# Patient Record
Sex: MALE | Race: OTHER | NOT HISPANIC OR LATINO | URBAN - METROPOLITAN AREA
[De-identification: names, ages, dates, MRNs, and addresses within clinical notes are randomized per-mention and may not be internally consistent; named-entity substitution may affect disease eponyms.]

---

## 2022-03-15 ENCOUNTER — NEW PATIENT (OUTPATIENT)
Dept: URBAN - METROPOLITAN AREA CLINIC 53 | Facility: CLINIC | Age: 67
End: 2022-03-15

## 2022-03-15 DIAGNOSIS — H31.011: ICD-10-CM

## 2022-03-15 DIAGNOSIS — H40.1414: ICD-10-CM

## 2022-03-15 DIAGNOSIS — H25.13: ICD-10-CM

## 2022-03-15 DIAGNOSIS — Z01.01: ICD-10-CM

## 2022-03-15 PROCEDURE — 92015 DETERMINE REFRACTIVE STATE: CPT

## 2022-03-15 PROCEDURE — 92004 COMPRE OPH EXAM NEW PT 1/>: CPT

## 2022-03-15 ASSESSMENT — VISUAL ACUITY
OS_SC: J1+
OD_SC: J6
OD_CC: 20/20
OD_SC: 20/25
OS_SC: 20/40
OS_CC: 20/20

## 2022-03-15 ASSESSMENT — TONOMETRY
OS_IOP_MMHG: 20
OD_IOP_MMHG: 23

## 2022-03-15 NOTE — PATIENT DISCUSSION
Slightly elevated IOP OD. Schedule next available HVF/RNFL OCT. Patient will be called with results.

## 2022-04-21 ENCOUNTER — DIAGNOSTICS ONLY (OUTPATIENT)
Dept: URBAN - METROPOLITAN AREA CLINIC 48 | Facility: CLINIC | Age: 67
End: 2022-04-21

## 2022-04-21 DIAGNOSIS — H40.1414: ICD-10-CM

## 2022-04-21 PROCEDURE — 92083 EXTENDED VISUAL FIELD XM: CPT

## 2022-04-21 PROCEDURE — 92133 CPTRZD OPH DX IMG PST SGM ON: CPT

## 2022-05-31 ENCOUNTER — FOLLOW UP (OUTPATIENT)
Dept: URBAN - METROPOLITAN AREA CLINIC 53 | Facility: CLINIC | Age: 67
End: 2022-05-31

## 2022-05-31 DIAGNOSIS — H40.1414: ICD-10-CM

## 2022-05-31 DIAGNOSIS — H25.13: ICD-10-CM

## 2022-05-31 PROCEDURE — 76514 ECHO EXAM OF EYE THICKNESS: CPT

## 2022-05-31 PROCEDURE — 92012 INTRM OPH EXAM EST PATIENT: CPT

## 2022-05-31 RX ORDER — BIMATOPROST 0.1 MG/ML
1 SOLUTION/ DROPS OPHTHALMIC EVERY EVENING
Start: 2022-05-31

## 2022-05-31 ASSESSMENT — TONOMETRY
OD_IOP_MMHG: 31
OS_IOP_MMHG: 23
OD_IOP_MMHG: 24
OS_IOP_MMHG: 19

## 2022-05-31 ASSESSMENT — PACHYMETRY
OD_CT_UM: 436
OS_CT_UM: 480

## 2022-05-31 ASSESSMENT — VISUAL ACUITY
OS_CC: 20/25+1
OU_CC: 20/20-1
OD_CC: 20/25-1

## 2022-05-31 NOTE — PATIENT DISCUSSION
HVF/RNFL OCT testing from 4/21/22 was reviewed with patient. Increased IOP OU today. Will start drop therapy. Start Lumigan OU Qhs. (sample given) Advised of redness with use. Follow up in 1 week  for IOP check.

## 2022-06-09 ENCOUNTER — FOLLOW UP (OUTPATIENT)
Dept: URBAN - METROPOLITAN AREA CLINIC 49 | Facility: CLINIC | Age: 67
End: 2022-06-09

## 2022-06-09 DIAGNOSIS — H40.052: ICD-10-CM

## 2022-06-09 DIAGNOSIS — H40.1414: ICD-10-CM

## 2022-06-09 PROCEDURE — 92012 INTRM OPH EXAM EST PATIENT: CPT

## 2022-06-09 ASSESSMENT — TONOMETRY
OS_IOP_MMHG: 16
OS_IOP_MMHG: 12
OD_IOP_MMHG: 21
OD_IOP_MMHG: 14

## 2022-06-09 ASSESSMENT — VISUAL ACUITY
OD_CC: 20/25-
OS_CC: 20/25

## 2022-06-09 NOTE — PATIENT DISCUSSION
Decreased IOP OU since starting drop therapy. Continue current Lumigan drop therapy. Follow up in 6 months for IOP check.

## 2023-01-18 ENCOUNTER — FOLLOW UP (OUTPATIENT)
Dept: URBAN - METROPOLITAN AREA CLINIC 49 | Facility: CLINIC | Age: 68
End: 2023-01-18

## 2023-01-18 DIAGNOSIS — H40.1421: ICD-10-CM

## 2023-01-18 DIAGNOSIS — H40.1414: ICD-10-CM

## 2023-01-18 PROCEDURE — 92012 INTRM OPH EXAM EST PATIENT: CPT

## 2023-01-18 PROCEDURE — 92020 GONIOSCOPY: CPT

## 2023-01-18 PROCEDURE — 65855 TRABECULOPLASTY LASER SURG: CPT

## 2023-01-18 ASSESSMENT — TONOMETRY
OS_IOP_MMHG: 12
OD_IOP_MMHG: 16
OS_IOP_MMHG: 16
OD_IOP_MMHG: 23

## 2023-01-18 ASSESSMENT — VISUAL ACUITY
OU_CC: 20/20
OS_CC: 20/20
OD_CC: 20/25

## 2023-01-18 NOTE — PROCEDURE NOTE: CLINICAL
PROCEDURE NOTE: SLT OD. Diagnosis: Pseudoexfoliation Glaucoma, Indeterminate. Anesthesia: Topical. Prior to treatment, the risks/benefits/alternatives were discussed. The patient wished to proceed with procedure. Alphagan, Proparacaine and Pilocarpine given pre laser. The patient was seated at the laser and the Selective Laser Trabecculoplasty (SLT) gonio lens was placed on the ocular surface with protective lubricant to protect the ocular surface. Power: 1.2mJ. Pulses: 80.  Patient tolerated procedure well. There were no complications. Post Laser IOP: *. Post procedure instructions given. Amaris Cortes

## 2023-01-18 NOTE — PATIENT DISCUSSION
IOP 16 adjust to 23. Gonio done in office discussed with patient option of SLT OD today to lower IOP. Consent form signed in office.  Continue Lumigan OU Qhs.

## 2023-04-25 ENCOUNTER — FOLLOW UP (OUTPATIENT)
Dept: URBAN - METROPOLITAN AREA CLINIC 53 | Facility: CLINIC | Age: 68
End: 2023-04-25

## 2023-04-25 DIAGNOSIS — H40.1414: ICD-10-CM

## 2023-04-25 DIAGNOSIS — H40.1421: ICD-10-CM

## 2023-04-25 PROCEDURE — 92012 INTRM OPH EXAM EST PATIENT: CPT

## 2023-04-25 ASSESSMENT — TONOMETRY
OS_IOP_MMHG: 13
OD_IOP_MMHG: 22
OS_IOP_MMHG: 17
OD_IOP_MMHG: 15

## 2023-04-25 ASSESSMENT — VISUAL ACUITY
OS_CC: 20/20
OU_CC: 20/20
OD_CC: 20/25-2